# Patient Record
Sex: MALE | Race: BLACK OR AFRICAN AMERICAN | NOT HISPANIC OR LATINO | Employment: FULL TIME | URBAN - METROPOLITAN AREA
[De-identification: names, ages, dates, MRNs, and addresses within clinical notes are randomized per-mention and may not be internally consistent; named-entity substitution may affect disease eponyms.]

---

## 2017-09-13 ENCOUNTER — HOSPITAL ENCOUNTER (EMERGENCY)
Facility: HOSPITAL | Age: 37
Discharge: HOME | End: 2017-09-13
Attending: EMERGENCY MEDICINE

## 2017-09-13 VITALS
DIASTOLIC BLOOD PRESSURE: 92 MMHG | OXYGEN SATURATION: 98 % | RESPIRATION RATE: 18 BRPM | WEIGHT: 250 LBS | TEMPERATURE: 98 F | HEIGHT: 66 IN | BODY MASS INDEX: 40.18 KG/M2 | SYSTOLIC BLOOD PRESSURE: 149 MMHG | HEART RATE: 87 BPM

## 2017-09-13 DIAGNOSIS — R03.0 BLOOD PRESSURE ELEVATED WITHOUT HISTORY OF HTN: Primary | ICD-10-CM

## 2017-09-13 PROCEDURE — 99283 EMERGENCY DEPT VISIT LOW MDM: CPT

## 2017-09-13 NOTE — ED PROVIDER NOTES
"Encounter Date: 9/13/2017       History     Chief Complaint   Patient presents with    Hypertension     went today for physical and was found to have hypertension, denies symptoms     Steven Sinclair, a 37 y.o. male that presents to the ED for concern for hypertension.  He received a physical for a job today and was told that his blood pressure was elevated.  He has no previous HTN diagnosis and wanted to "see if he has high blood pressure". Patient denies any headache, changes in vision or any physical complaints today.              Review of patient's allergies indicates:  No Known Allergies  History reviewed. No pertinent past medical history.  History reviewed. No pertinent surgical history.  History reviewed. No pertinent family history.  Social History   Substance Use Topics    Smoking status: Never Smoker    Smokeless tobacco: Never Used    Alcohol use Yes      Comment: socially     Review of Systems   Constitutional: Negative for fever.   Respiratory: Negative for shortness of breath.    Cardiovascular: Negative for chest pain.        Concern for HTN    Gastrointestinal: Negative for abdominal pain, nausea and vomiting.   Skin: Negative for color change.   Allergic/Immunologic: Negative for immunocompromised state.   Neurological: Negative for dizziness.   Psychiatric/Behavioral: Negative for agitation.   All other systems reviewed and are negative.      Physical Exam     Initial Vitals [09/13/17 1521]   BP Pulse Resp Temp SpO2   (!) 149/92 87 18 98.4 °F (36.9 °C) 98 %      MAP       111         Physical Exam    Nursing note and vitals reviewed.  Constitutional: He appears well-developed and well-nourished.   HENT:   Head: Normocephalic and atraumatic.   Right Ear: External ear normal.   Left Ear: External ear normal.   Nose: Nose normal.   Mouth/Throat: Oropharynx is clear and moist.   Eyes: EOM are normal.   Neck: Normal range of motion. Neck supple.   Cardiovascular: Normal rate and regular " rhythm.   Pulmonary/Chest: Breath sounds normal. No respiratory distress. He has no wheezes. He has no rhonchi. He has no rales.   Abdominal: Soft. Bowel sounds are normal.   Musculoskeletal: Normal range of motion. He exhibits no edema or tenderness.   Neurological: He is alert and oriented to person, place, and time. No cranial nerve deficit.   Skin: Skin is warm and dry. No rash and no abscess noted. No erythema.   Psychiatric: He has a normal mood and affect. Thought content normal.         ED Course   Procedures  Labs Reviewed - No data to display          Medical Decision Making:   Initial Assessment:   Patient has no complaints and wants to know if he has HTN   Differential Diagnosis:   Essential vs secondary HTN vs elevated blood pressure  ED Management:  Patient presents to ED in NAD, afebrile, non-toxic and with no complaints.  He wants to know if he has elevated blood pressure.  Patient's blood pressure is 149/92 currently in ED.  He was informed of the need to have at least 3 BP measurements with elevated pressure to make a diagnosis.  He was given information on lifestyle modifications including diet, exercise and weight loss that may help with HTN.  He was instructed to f/u with PCP for further evaluation.  Strict return precautions given and patient verbalized understanding.                       ED Course      Clinical Impression:   The encounter diagnosis was Blood pressure elevated without history of HTN.                           Annemarie Hassan PA-C  09/13/17 6228

## 2017-09-13 NOTE — ED TRIAGE NOTES
Pt reports was at a physical for his job and was noted to have elevated blood pressure.  Denies hx of HTN, denies any symptoms, denies dizziness, headache, or visual changes.

## 2017-09-25 ENCOUNTER — HOSPITAL ENCOUNTER (EMERGENCY)
Facility: HOSPITAL | Age: 37
Discharge: HOME | End: 2017-09-25
Attending: EMERGENCY MEDICINE

## 2017-09-25 VITALS
SYSTOLIC BLOOD PRESSURE: 125 MMHG | BODY MASS INDEX: 40.18 KG/M2 | WEIGHT: 250 LBS | HEART RATE: 93 BPM | TEMPERATURE: 99 F | HEIGHT: 66 IN | OXYGEN SATURATION: 97 % | DIASTOLIC BLOOD PRESSURE: 77 MMHG | RESPIRATION RATE: 20 BRPM

## 2017-09-25 DIAGNOSIS — R03.0 ELEVATED BLOOD PRESSURE READING: Primary | ICD-10-CM

## 2017-09-25 PROCEDURE — 99283 EMERGENCY DEPT VISIT LOW MDM: CPT

## 2017-09-25 NOTE — ED PROVIDER NOTES
"Encounter Date: 9/25/2017       History     Chief Complaint   Patient presents with    Hypertension     pt was sent from job physical for high blood pressure     Steven Sinclair, a 37 y.o. male that presents to the ED for concern for hypertension.  This is the second time he was "fit tested" for a job and was told that his "bottom number was too high"  He is currently normotensive in the ED.  He received a physical for a job today and was told that his blood pressure was elevated.  He has no previous HTN diagnosis and wanted to "see if he has high blood pressure". Patient denies any headache, changes in vision or any physical complaints today.        The history is provided by the patient.     Review of patient's allergies indicates:  No Known Allergies  History reviewed. No pertinent past medical history.  History reviewed. No pertinent surgical history.  History reviewed. No pertinent family history.  Social History   Substance Use Topics    Smoking status: Never Smoker    Smokeless tobacco: Never Used    Alcohol use Yes      Comment: socially     Review of Systems   Constitutional: Negative for fever.   Respiratory: Negative for shortness of breath.    Cardiovascular: Negative for chest pain.   Musculoskeletal: Negative for back pain.   Skin: Negative for color change.   Neurological: Negative for weakness, numbness and headaches.   Psychiatric/Behavioral: Negative for agitation.   All other systems reviewed and are negative.      Physical Exam     Initial Vitals [09/25/17 1030]   BP Pulse Resp Temp SpO2   138/83 84 18 98.5 °F (36.9 °C) 96 %      MAP       101.33         Physical Exam    Nursing note and vitals reviewed.  Constitutional: He appears well-developed and well-nourished.   HENT:   Head: Normocephalic and atraumatic.   Right Ear: External ear normal.   Left Ear: External ear normal.   Nose: Nose normal.   Mouth/Throat: Oropharynx is clear and moist.   Eyes: EOM are normal.   Neck: Normal " range of motion. Neck supple.   Cardiovascular: Normal rate and regular rhythm.   Pulmonary/Chest: Breath sounds normal.   Abdominal: Soft. Bowel sounds are normal.   Musculoskeletal: Normal range of motion. He exhibits no edema or tenderness.   Neurological: He is alert and oriented to person, place, and time. No cranial nerve deficit.   Skin: Skin is warm and dry. No rash and no abscess noted. No erythema.   Psychiatric: He has a normal mood and affect. Thought content normal.         ED Course   Procedures  Labs Reviewed - No data to display          Medical Decision Making:   Initial Assessment:   Concern for elevated blood pressure  ED Management:  He wants to know if he has elevated blood pressure.  Patient's blood pressure is 138/83 currently in ED.  This is his second visit to the ED for this concern, despite being given instructions to f/u with PCP.  He was informed of the need to have at least 3 BP measurements with elevated pressure to make a diagnosis.  He was given information on lifestyle modifications including diet, exercise and weight loss that may help with HTN.  He was instructed to f/u with PCP for further evaluation and again given information to the [facility].  Strict return precautions given.                Attending Attestation:     Physician Attestation Statement for NP/PA:   I discussed this assessment and plan of this patient with the NP/PA, but I did not personally examine the patient. The face to face encounter was performed by the NP/PA.    Other NP/PA Attestation Additions:    History of Present Illness: Sent from work physical for elevated BP reading.  BP not elevated in ER.  Pt w/o complaints.  Seen for same in past toni BP elevated at work physical.  Never followed up with PCP for evaluation.    Medical Decision Making: Asymptomatic without elevated BP in ER.  Pt again instructed on importance of follow up.                 ED Course      Clinical Impression:   The  encounter diagnosis was Elevated blood pressure reading.                           Annemarie Hassan PA-C  09/25/17 1212       Mary Garcia MD  09/29/17 4180

## 2017-09-25 NOTE — ED TRIAGE NOTES
Pt was sent from job due to high blood pressure reading at job physical pt has no current complaints

## 2018-01-20 ENCOUNTER — HOSPITAL ENCOUNTER (EMERGENCY)
Facility: HOSPITAL | Age: 38
Discharge: HOME | End: 2018-01-20
Attending: EMERGENCY MEDICINE
Payer: COMMERCIAL

## 2018-01-20 VITALS
TEMPERATURE: 100 F | HEART RATE: 110 BPM | RESPIRATION RATE: 18 BRPM | SYSTOLIC BLOOD PRESSURE: 160 MMHG | BODY MASS INDEX: 40.18 KG/M2 | HEIGHT: 66 IN | DIASTOLIC BLOOD PRESSURE: 90 MMHG | WEIGHT: 250 LBS | OXYGEN SATURATION: 98 %

## 2018-01-20 DIAGNOSIS — J06.9 UPPER RESPIRATORY TRACT INFECTION, UNSPECIFIED TYPE: Primary | ICD-10-CM

## 2018-01-20 LAB
FLUAV AG SPEC QL IA: NEGATIVE
FLUBV AG SPEC QL IA: NEGATIVE
SPECIMEN SOURCE: NORMAL

## 2018-01-20 PROCEDURE — 87400 INFLUENZA A/B EACH AG IA: CPT

## 2018-01-20 PROCEDURE — 96372 THER/PROPH/DIAG INJ SC/IM: CPT

## 2018-01-20 PROCEDURE — 25000003 PHARM REV CODE 250: Performed by: EMERGENCY MEDICINE

## 2018-01-20 PROCEDURE — 63600175 PHARM REV CODE 636 W HCPCS: Performed by: EMERGENCY MEDICINE

## 2018-01-20 PROCEDURE — 99283 EMERGENCY DEPT VISIT LOW MDM: CPT | Mod: 25

## 2018-01-20 RX ORDER — ACETAMINOPHEN 500 MG
1000 TABLET ORAL
Status: COMPLETED | OUTPATIENT
Start: 2018-01-20 | End: 2018-01-20

## 2018-01-20 RX ORDER — PROCHLORPERAZINE EDISYLATE 5 MG/ML
10 INJECTION INTRAMUSCULAR; INTRAVENOUS
Status: COMPLETED | OUTPATIENT
Start: 2018-01-20 | End: 2018-01-20

## 2018-01-20 RX ORDER — KETOROLAC TROMETHAMINE 30 MG/ML
30 INJECTION, SOLUTION INTRAMUSCULAR; INTRAVENOUS
Status: COMPLETED | OUTPATIENT
Start: 2018-01-20 | End: 2018-01-20

## 2018-01-20 RX ORDER — ONDANSETRON 4 MG/1
4 TABLET, FILM COATED ORAL EVERY 6 HOURS PRN
Qty: 10 TABLET | Refills: 0 | Status: SHIPPED | OUTPATIENT
Start: 2018-01-20

## 2018-01-20 RX ORDER — BENZONATATE 100 MG/1
100 CAPSULE ORAL 3 TIMES DAILY PRN
Qty: 14 CAPSULE | Refills: 0 | Status: SHIPPED | OUTPATIENT
Start: 2018-01-20 | End: 2024-03-03

## 2018-01-20 RX ADMIN — PROCHLORPERAZINE EDISYLATE 10 MG: 5 INJECTION INTRAMUSCULAR; INTRAVENOUS at 10:01

## 2018-01-20 RX ADMIN — KETOROLAC TROMETHAMINE 30 MG: 30 INJECTION, SOLUTION INTRAMUSCULAR at 10:01

## 2018-01-20 RX ADMIN — ACETAMINOPHEN 1000 MG: 500 TABLET ORAL at 10:01

## 2018-01-21 NOTE — DISCHARGE INSTRUCTIONS
Please make sure you are drinking plenty of fluids, such as gatorade G2 or Powerade Light. In the morning, I recommend taking ibuprofen 600mg every 8 hours with food.

## 2018-01-21 NOTE — ED PROVIDER NOTES
Encounter Date: 1/20/2018       History     Chief Complaint   Patient presents with    Chills     reports flu like s/s for one day. reports wife also has flu     Cough    Fever    Sore Throat    Generalized Body Aches     37-year-old male presents emergency department for about 24 hours of nasal congestion, headache, myalgias, sore throat, cough, fever.  Onset last night, somewhat worse today.  Minimal relief with TheraFlu.  Reports his headache started as a mild, generalized, throbbing headache and is now moderate.  It is gradually worsened since its onset.  It is exacerbated by cough and has no relieving factors reported.  Reports nausea without emesis.  He reports a persistent nonproductive cough.  Also reports sinus pressure and postnasal drip.  Reports subjective fever.  Denies vomiting.  Denies shortness of breath or chest pain.          Review of patient's allergies indicates:  No Known Allergies  History reviewed. No pertinent past medical history.  History reviewed. No pertinent surgical history.  History reviewed. No pertinent family history.  Social History   Substance Use Topics    Smoking status: Never Smoker    Smokeless tobacco: Never Used    Alcohol use Yes      Comment: socially     Review of Systems   Constitutional: Positive for fatigue and fever (subjective). Negative for chills.   HENT: Positive for congestion, postnasal drip, sinus pressure and sore throat. Negative for voice change.    Eyes: Negative for photophobia, pain and redness.   Respiratory: Positive for cough. Negative for choking and shortness of breath.    Cardiovascular: Negative for chest pain, palpitations and leg swelling.   Gastrointestinal: Positive for nausea. Negative for abdominal pain and vomiting.   Genitourinary: Negative for dysuria, frequency and urgency.   Musculoskeletal: Negative for back pain, neck pain and neck stiffness.   Neurological: Positive for headaches. Negative for seizures, speech difficulty,  light-headedness and numbness.   All other systems reviewed and are negative.      Physical Exam     Initial Vitals [01/20/18 2129]   BP Pulse Resp Temp SpO2   (!) 153/100 (!) 121 20 (!) 101 °F (38.3 °C) 100 %      MAP       117.67         Physical Exam    Nursing note and vitals reviewed.  Constitutional: He appears well-developed and well-nourished. He appears distressed (Mild discomfort).   HENT:   Head: Normocephalic and atraumatic.   Oropharynx erythematous without swelling or exudate; Dry MM   Eyes: Conjunctivae and EOM are normal. Pupils are equal, round, and reactive to light.   Neck: Normal range of motion. Neck supple. No tracheal deviation present.   Cardiovascular: Regular rhythm, normal heart sounds and intact distal pulses.   Tachycardic   Pulmonary/Chest: Breath sounds normal. No respiratory distress. He has no wheezes. He has no rhonchi. He has no rales.   Abdominal: Soft. Bowel sounds are normal. He exhibits no distension. There is no tenderness. There is no rebound and no guarding.   Musculoskeletal: Normal range of motion. He exhibits no edema or tenderness.   Neurological: He is alert and oriented to person, place, and time. He has normal strength. No cranial nerve deficit or sensory deficit.   Skin: Skin is warm and dry. Capillary refill takes less than 2 seconds.         ED Course   Procedures  Labs Reviewed   INFLUENZA A AND B ANTIGEN             Medical Decision Making:   Initial Assessment:   37-year-old male presents emergency department complaining of sore throat, postnasal drip, cough, myalgias, fever  Differential Diagnosis:   URI, sinusitis, pneumonia, retropharyngeal nurse peritonsillar abscess, strep pharyngitis, influenza  Clinical Tests:   Lab Tests: Reviewed       <> Summary of Lab: Influenza negative  ED Management:  Patient given IM Toradol and Compazine.  He reports significant improvement in his symptoms.  We discussed disposition including discharge with prescriptions for  Zofran and Tessalon, instructions to increase oral hydration with Gatorade G2 or Powerade light, begin taking ibuprofen 600 mg every 8 hours with food in the morning, follow-up with his primary care physician this week, return with new or worsening symptoms.  Patient expressed good understanding and is comfortable with discharge at this time.                   ED Course      Clinical Impression:   The encounter diagnosis was Upper respiratory tract infection, unspecified type.    Disposition:   Disposition: Discharged  Condition: Stable                        Lj Lopez MD  01/20/18 5759

## 2018-01-21 NOTE — ED NOTES
Patient identifiers for Steven Sinclair checked and correct.  LOC: The patient is awake, alert and aware of environment with an appropriate affect, the patient is oriented x 3 and speaking appropriately.  APPEARANCE: Patient uncomfortable and in no acute distress, patient is clean and well groomed, patient's clothing are properly fastened.  MUSKULOSKELETAL: Patient moving all extremities well, no obvious swelling or deformities noted.  RESPIRATORY: Airway is open and patent, respirations are spontaneous, patient has a normal effort and rate.

## 2023-05-05 ENCOUNTER — OFFICE VISIT (OUTPATIENT)
Dept: URGENT CARE | Facility: CLINIC | Age: 43
End: 2023-05-05
Payer: COMMERCIAL

## 2023-05-05 VITALS
WEIGHT: 250 LBS | SYSTOLIC BLOOD PRESSURE: 119 MMHG | DIASTOLIC BLOOD PRESSURE: 81 MMHG | RESPIRATION RATE: 20 BRPM | HEART RATE: 78 BPM | BODY MASS INDEX: 40.18 KG/M2 | HEIGHT: 66 IN | OXYGEN SATURATION: 95 % | TEMPERATURE: 98 F

## 2023-05-05 DIAGNOSIS — M77.32 HEEL SPUR, LEFT: ICD-10-CM

## 2023-05-05 DIAGNOSIS — M79.672 PAIN OF LEFT HEEL: Primary | ICD-10-CM

## 2023-05-05 DIAGNOSIS — Z76.89 ENCOUNTER TO ESTABLISH CARE: ICD-10-CM

## 2023-05-05 PROCEDURE — 73630 XR FOOT COMPLETE 3 VIEW LEFT: ICD-10-PCS | Mod: FY,LT,S$GLB, | Performed by: RADIOLOGY

## 2023-05-05 PROCEDURE — 99203 OFFICE O/P NEW LOW 30 MIN: CPT | Mod: S$GLB,,,

## 2023-05-05 PROCEDURE — 73630 X-RAY EXAM OF FOOT: CPT | Mod: FY,LT,S$GLB, | Performed by: RADIOLOGY

## 2023-05-05 PROCEDURE — 99203 PR OFFICE/OUTPT VISIT, NEW, LEVL III, 30-44 MIN: ICD-10-PCS | Mod: S$GLB,,,

## 2023-05-05 NOTE — PATIENT INSTRUCTIONS
R.I.C.E:    Rest, apply ice intermittently about 20 minutes 3 times a day, compress with ace wrap/boot, and elevate above heart level as much as possible.  Do not apply ice directly to skin. Wrap ice in cloth before applying.    Please make sure that you wearing appropriate supportive shoes for lower extremity issues including shoes with heel support    OTC Tylenol (acetaminophen) up to 4,000 mg a day is safe for short periods and can be used for pain, and fever. However in high doses and prolonged use it can cause liver irritation.    OTC Ibuprofen (NSAID) is a non-steroidal anti-inflammatory that can be used for pain. However it can also cause stomach irritation if over used.    Of note: Aleve, Motrin, Advil, Mobic, meloxicam, and indomethacin are also other names of NSAIDs.    OTC Voltaren topical cream may be applied for relief, as long as you do not have any allergy to the ingredients.    You will need to follow-up with primary care provider if your symptoms persist, as we discussed. If you do not have a PCP, you will be referred to one    AMBULATORY REFERRAL ORDERED:   Someone will call with an appointment this week.  Please answer all unknown calls.  If you are not contacted within 7 days, Please call clinic  for status of appointment. ([phone])

## 2023-05-05 NOTE — PROGRESS NOTES
"Subjective:      Patient ID: Steven Sinclair is a 43 y.o. male.    Vitals:  height is 5' 6" (1.676 m) and weight is 113.4 kg (250 lb). His temperature is 98.1 °F (36.7 °C). His blood pressure is 119/81 and his pulse is 78. His respiration is 20 and oxygen saturation is 95%.     Chief Complaint: Foot Pain    This is a 43 y.o. male who presents today with a chief complaint of left heel pain x 2 days ago. No injury noted. Aggravated by weight bearing and movement. States this is the 3rd episode since October. States the pain usually last 2 days and then improves steadily. States this time the pain is remaining longer than usual. Patient has tried taking muscle relaxer with no relief. 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 No pain in R foot. No pain in toes or ankle. No leg swelling. NKDA.       Foot Injury   The incident occurred 2 days ago. There was no injury mechanism. Pain location: left heel. The quality of the pain is described as aching. The pain is at a severity of 8/10. The pain is moderate. Pertinent negatives include no numbness or tingling. He reports no foreign bodies present. The symptoms are aggravated by movement and weight bearing. He has tried nothing for the symptoms.     Constitution: Negative for chills and fever.   Musculoskeletal:  Positive for pain with walking. Negative for joint pain, joint swelling and abnormal ROM of joint.   Skin:  Negative for color change, wound, laceration, erythema and bruising.   Neurological:  Negative for numbness and tingling.    Objective:     Vitals:    05/05/23 1047   BP: 119/81   Pulse: 78   Resp: 20   Temp: 98.1 °F (36.7 °C)       Physical Exam   Constitutional: He is oriented to person, place, and time. He appears well-developed.   HENT:   Head: Normocephalic and atraumatic. Head is without abrasion, without contusion and without laceration.   Ears:   Right Ear: External ear normal.   Left Ear: External ear normal.   Nose: Nose normal. "   Mouth/Throat: Oropharynx is clear and moist and mucous membranes are normal.   Eyes: Conjunctivae, EOM and lids are normal. Pupils are equal, round, and reactive to light.   Neck: Trachea normal and phonation normal. Neck supple.   Cardiovascular: Normal rate, regular rhythm and normal heart sounds.   Pulses:       Dorsalis pedis pulses are 2+ on the left side.   Pulmonary/Chest: Effort normal and breath sounds normal. No stridor. No respiratory distress.   Musculoskeletal:         General: Tenderness present. No swelling, deformity or signs of injury.      Right ankle: Normal.      Left ankle: Normal.      Right lower leg: No edema.      Left lower leg: No edema.      Left foot: Anterior drawer test: negative. Plantar foot sensation: normal. Comments: Pain with palpation to posterior heel. FROM of toes and ankle. No puncture wounds or injury. No pain to achilles tendon.         Feet:    Neurological: He is alert and oriented to person, place, and time.   Skin: Skin is warm, dry, intact and no rash. Capillary refill takes less than 2 seconds. No abrasion, No burn, No bruising, No erythema and No ecchymosis   Psychiatric: His speech is normal and behavior is normal. Judgment and thought content normal.   Nursing note and vitals reviewed.    Assessment:     1. Pain of left heel    2. Heel spur, left    3. Encounter to establish care      X-Ray Foot Complete 3 view Left    Result Date: 5/5/2023  EXAMINATION: XR FOOT COMPLETE 3 VIEW LEFT CLINICAL HISTORY: .  Pain in left foot TECHNIQUE: AP, lateral and oblique views of the left foot were performed. COMPARISON: None FINDINGS: No definite evidence of acute fracture or dislocation.  Lisfranc joint congruent.  Joint spaces maintained.  Modest enthesopathic change at the Achilles tendon insertion on the calcaneus.  No evidence of radiopaque foreign body.     No convincing evidence of acute fracture or dislocation. Electronically signed by: Raymond Amador  Date:    05/05/2023 Time:    11:59     Plan:       Pain of left heel  -     X-Ray Foot Complete 3 view Left; Future; Expected date: 05/05/2023    Heel spur, left    Encounter to establish care  -     Ambulatory referral/consult to Internal Medicine        Patient Instructions   R.I.C.E:    Rest, apply ice intermittently about 20 minutes 3 times a day, compress with ace wrap/boot, and elevate above heart level as much as possible.  Do not apply ice directly to skin. Wrap ice in cloth before applying.    Please make sure that you wearing appropriate supportive shoes for lower extremity issues including shoes with heel support    OTC Tylenol (acetaminophen) up to 4,000 mg a day is safe for short periods and can be used for pain, and fever. However in high doses and prolonged use it can cause liver irritation.    OTC Ibuprofen (NSAID) is a non-steroidal anti-inflammatory that can be used for pain. However it can also cause stomach irritation if over used.    Of note: Aleve, Motrin, Advil, Mobic, meloxicam, and indomethacin are also other names of NSAIDs.    OTC Voltaren topical cream may be applied for relief, as long as you do not have any allergy to the ingredients.    You will need to follow-up with primary care provider if your symptoms persist, as we discussed.    Medical Decision Making:   History:   Old Medical Records: I decided to obtain old medical records.  Independently Interpreted Test(s):   I have ordered and independently interpreted X-rays - see summary below.       <> Summary of X-Ray Reading(s): No fracture or dislocation. Heel spur present. Will defer to radiologist.  Clinical Tests:   Radiological Study: Ordered and Reviewed  Urgent Care Management:  Patient refused walking boot. Encouraged RICE method with supportive shoes and to follow up with PCP.

## 2024-03-03 ENCOUNTER — HOSPITAL ENCOUNTER (EMERGENCY)
Facility: HOSPITAL | Age: 44
Discharge: HOME | End: 2024-03-03
Attending: EMERGENCY MEDICINE
Payer: COMMERCIAL

## 2024-03-03 VITALS
RESPIRATION RATE: 18 BRPM | HEART RATE: 78 BPM | TEMPERATURE: 99 F | OXYGEN SATURATION: 99 % | SYSTOLIC BLOOD PRESSURE: 142 MMHG | DIASTOLIC BLOOD PRESSURE: 86 MMHG

## 2024-03-03 DIAGNOSIS — J06.9 VIRAL URI WITH COUGH: Primary | ICD-10-CM

## 2024-03-03 LAB
INFLUENZA A, MOLECULAR: NEGATIVE
INFLUENZA B, MOLECULAR: NEGATIVE
SARS-COV-2 RDRP RESP QL NAA+PROBE: NEGATIVE
SPECIMEN SOURCE: NORMAL

## 2024-03-03 PROCEDURE — 87502 INFLUENZA DNA AMP PROBE: CPT | Performed by: EMERGENCY MEDICINE

## 2024-03-03 PROCEDURE — 99283 EMERGENCY DEPT VISIT LOW MDM: CPT | Mod: 25

## 2024-03-03 PROCEDURE — U0002 COVID-19 LAB TEST NON-CDC: HCPCS | Performed by: EMERGENCY MEDICINE

## 2024-03-03 RX ORDER — BENZONATATE 100 MG/1
100 CAPSULE ORAL 3 TIMES DAILY PRN
Qty: 14 CAPSULE | Refills: 0 | Status: SHIPPED | OUTPATIENT
Start: 2024-03-03

## 2024-03-04 NOTE — ED NOTES
Pt reports to ED with c/o cough, congestion, and runny nose x2 weeks.     Adult Physical Assessment  LOC: Steven Sinclair, 43 y.o. male verified via two identifiers.  The patient is awake, alert, oriented and speaking appropriately at this time.  APPEARANCE: Patient resting comfortably and appears to be in no acute distress at this time. Patient is clean and well groomed, patient's clothing is properly fastened.  SKIN:The skin is warm and dry, color consistent with ethnicity, patient has normal skin turgor and moist mucus membranes, skin intact, no breakdown or brusing noted.  MUSCULOSKELETAL: Patient moving all extremities well, no obvious swelling or deformities noted.  RESPIRATORY: Airway is open and patent, respirations are spontaneous, patient has a normal effort and rate, no accessory muscle use noted. Pt reports cough and congestion.   CARDIAC: Patient has a normal rate and rhythm, no periphreal edema noted in any extremity, capillary refill < 3 seconds in all extremities  ABDOMEN: Soft and non tender to palpation, no abdominal distention noted. Bowel sounds present in all four quadrants.  NEUROLOGIC: Eyes open spontaneously, behavior appropriate to situation, follows commands, facial expression symmetrical, bilateral hand grasp equal and even, purposeful motor response noted, normal sensation in all extremities when touched with a finger.

## 2024-03-04 NOTE — ED PROVIDER NOTES
Encounter Date: 3/3/2024       History     Chief Complaint   Patient presents with    COVID-19 Concerns     Flu and or covid concerns symtoms have been for two weeks with no improvement. Cough congestion runny nose no sore throat     The patient is a 43-year-old male who came to the emergency department with URI symptoms for the past 2 weeks.  He has also had a cough with green sputum.  Afebrile.  The patient does not smoke, no history of COPD.  No fever.  No nausea vomiting or diarrhea no chest pain or shortness of breath.      Review of patient's allergies indicates:  No Known Allergies  History reviewed. No pertinent past medical history.  History reviewed. No pertinent surgical history.  History reviewed. No pertinent family history.  Social History     Tobacco Use    Smoking status: Never    Smokeless tobacco: Never   Substance Use Topics    Alcohol use: Not Currently     Comment: socially    Drug use: No     Review of Systems   Constitutional:  Negative for fever.   HENT:  Positive for congestion. Negative for sore throat.    Respiratory:  Positive for cough. Negative for shortness of breath.    Cardiovascular:  Negative for chest pain.   Gastrointestinal:  Negative for nausea.   Genitourinary:  Negative for dysuria.   Musculoskeletal:  Negative for back pain.   Skin:  Negative for rash.   Neurological:  Negative for weakness.   Hematological:  Does not bruise/bleed easily.       Physical Exam     Initial Vitals [03/03/24 1729]   BP Pulse Resp Temp SpO2   (!) 147/84 83 20 98.1 °F (36.7 °C) 98 %      MAP       --         Physical Exam    Nursing note and vitals reviewed.  Constitutional: He appears well-developed and well-nourished.   HENT:   Mouth/Throat: Oropharynx is clear and moist.   Eyes: Pupils are equal, round, and reactive to light.   Neck: Neck supple.   Normal range of motion.  Pulmonary/Chest: Breath sounds normal.   Abdominal: Abdomen is soft. There is no abdominal tenderness.   Musculoskeletal:          General: No edema. Normal range of motion.      Cervical back: Normal range of motion and neck supple.     Neurological: He is alert and oriented to person, place, and time.   Skin: Skin is warm and dry.         ED Course   Procedures  Labs Reviewed   INFLUENZA A & B BY MOLECULAR   SARS-COV-2 RNA AMPLIFICATION, QUAL          Imaging Results              X-Ray Chest AP Portable (Final result)  Result time 03/03/24 19:50:35      Final result by Devonte Og MD (03/03/24 19:50:35)                   Impression:      1. No acute cardiopulmonary process.      Electronically signed by: Devonte Og MD  Date:    03/03/2024  Time:    19:50               Narrative:    EXAMINATION:  XR CHEST AP PORTABLE    CLINICAL HISTORY:  cough;    TECHNIQUE:  Single frontal view of the chest was performed.    COMPARISON:  None    FINDINGS:  The cardiomediastinal silhouette is not enlarged.  There is no pleural effusion.  The trachea is midline.  The lungs are symmetrically expanded bilaterally without evidence of acute parenchymal process. No large focal consolidation seen.  There is no pneumothorax.  The osseous structures are unremarkable.                                       Medications - No data to display  Medical Decision Making  Differential Diagnosis includes, but is not limited to:  Sepsis, meningitis, cavernous sinus thrombosis, nasal foreign body, otitis media/external, nasal polyp, bacterial sinusitis, allergic rhinitis, influenza, bacterial/viral pharyngitis, peritonsillar abscess, retropharyngeal abscess, bacterial/viral pneumonia.     MDM:  The patient is a 43-year-old male with URI symptoms for the past 2 weeks.  We will check COVID and flu and a chest x-ray    2041: CXR clear, COVID and flu are neg. He will be discharged at this time.    Amount and/or Complexity of Data Reviewed  Labs:  Decision-making details documented in ED Course.  Radiology: ordered.               ED Course as of 03/03/24 2043   Sun  Mar 03, 2024   1913 COVID-19 Rapid Screening [ST]   1913 Influenza A & B by Molecular [ST]      ED Course User Index  [ST] Layla Fallon MD                           Clinical Impression:  Final diagnoses:  [J06.9] Viral URI with cough (Primary)          ED Disposition Condition    Discharge Stable          ED Prescriptions       Medication Sig Dispense Start Date End Date Auth. Provider    benzonatate (TESSALON) 100 MG capsule Take 1 capsule (100 mg total) by mouth 3 (three) times daily as needed for Cough. 14 capsule 3/3/2024 -- Layla Fallon MD          Follow-up Information       Follow up With Specialties Details Why Contact Info Additional Information    [facility] Family Medicine Schedule an appointment as soon as possible for a visit  As needed, [facility] Referral Line (Tel: [phone]) [address], [address]  [place] [address] [phone] Please park in [address] or D and use [address]bert. Take Medical Office Bldg. elevators.             Layla Fallon MD  03/03/24 2044

## 2024-09-05 ENCOUNTER — OFFICE VISIT (OUTPATIENT)
Dept: PRIMARY CARE CLINIC | Facility: CLINIC | Age: 44
End: 2024-09-05
Payer: COMMERCIAL

## 2024-09-05 ENCOUNTER — LAB VISIT (OUTPATIENT)
Dept: LAB | Facility: HOSPITAL | Age: 44
End: 2024-09-05
Attending: STUDENT IN AN ORGANIZED HEALTH CARE EDUCATION/TRAINING PROGRAM
Payer: COMMERCIAL

## 2024-09-05 ENCOUNTER — TELEPHONE (OUTPATIENT)
Dept: SLEEP MEDICINE | Facility: CLINIC | Age: 44
End: 2024-09-05
Payer: COMMERCIAL

## 2024-09-05 VITALS
OXYGEN SATURATION: 94 % | BODY MASS INDEX: 39.86 KG/M2 | WEIGHT: 263 LBS | HEART RATE: 77 BPM | SYSTOLIC BLOOD PRESSURE: 140 MMHG | DIASTOLIC BLOOD PRESSURE: 100 MMHG | HEIGHT: 68 IN

## 2024-09-05 DIAGNOSIS — R06.81 APNEIC EPISODE: ICD-10-CM

## 2024-09-05 DIAGNOSIS — E66.01 MORBID OBESITY: ICD-10-CM

## 2024-09-05 DIAGNOSIS — Z00.00 ANNUAL PHYSICAL EXAM: ICD-10-CM

## 2024-09-05 DIAGNOSIS — R06.83 HABITUAL SNORING: ICD-10-CM

## 2024-09-05 DIAGNOSIS — Z00.00 ANNUAL PHYSICAL EXAM: Primary | ICD-10-CM

## 2024-09-05 LAB
ALBUMIN SERPL BCP-MCNC: 4.1 G/DL (ref 3.5–5.2)
ALP SERPL-CCNC: 136 U/L (ref 55–135)
ALT SERPL W/O P-5'-P-CCNC: 21 U/L (ref 10–44)
ANION GAP SERPL CALC-SCNC: 11 MMOL/L (ref 8–16)
AST SERPL-CCNC: 22 U/L (ref 10–40)
BASOPHILS # BLD AUTO: 0.05 K/UL (ref 0–0.2)
BASOPHILS NFR BLD: 0.8 % (ref 0–1.9)
BILIRUB SERPL-MCNC: 1 MG/DL (ref 0.1–1)
BUN SERPL-MCNC: 12 MG/DL (ref 6–20)
CALCIUM SERPL-MCNC: 10.2 MG/DL (ref 8.7–10.5)
CHLORIDE SERPL-SCNC: 104 MMOL/L (ref 95–110)
CHOLEST SERPL-MCNC: 250 MG/DL (ref 120–199)
CHOLEST/HDLC SERPL: 6.8 {RATIO} (ref 2–5)
CO2 SERPL-SCNC: 27 MMOL/L (ref 23–29)
CREAT SERPL-MCNC: 1.1 MG/DL (ref 0.5–1.4)
DIFFERENTIAL METHOD BLD: ABNORMAL
EOSINOPHIL # BLD AUTO: 0.4 K/UL (ref 0–0.5)
EOSINOPHIL NFR BLD: 6.6 % (ref 0–8)
ERYTHROCYTE [DISTWIDTH] IN BLOOD BY AUTOMATED COUNT: 13.5 % (ref 11.5–14.5)
EST. GFR  (NO RACE VARIABLE): >60 ML/MIN/1.73 M^2
ESTIMATED AVG GLUCOSE: 131 MG/DL (ref 68–131)
GLUCOSE SERPL-MCNC: 105 MG/DL (ref 70–110)
HBA1C MFR BLD: 6.2 % (ref 4–5.6)
HCT VFR BLD AUTO: 48.6 % (ref 40–54)
HCV AB SERPL QL IA: NORMAL
HDLC SERPL-MCNC: 37 MG/DL (ref 40–75)
HDLC SERPL: 14.8 % (ref 20–50)
HGB BLD-MCNC: 15.4 G/DL (ref 14–18)
HIV 1+2 AB+HIV1 P24 AG SERPL QL IA: NORMAL
IMM GRANULOCYTES # BLD AUTO: 0.01 K/UL (ref 0–0.04)
IMM GRANULOCYTES NFR BLD AUTO: 0.2 % (ref 0–0.5)
LDLC SERPL CALC-MCNC: 190.2 MG/DL (ref 63–159)
LYMPHOCYTES # BLD AUTO: 1.8 K/UL (ref 1–4.8)
LYMPHOCYTES NFR BLD: 29.9 % (ref 18–48)
MCH RBC QN AUTO: 28.9 PG (ref 27–31)
MCHC RBC AUTO-ENTMCNC: 31.7 G/DL (ref 32–36)
MCV RBC AUTO: 91 FL (ref 82–98)
MONOCYTES # BLD AUTO: 0.6 K/UL (ref 0.3–1)
MONOCYTES NFR BLD: 10.9 % (ref 4–15)
NEUTROPHILS # BLD AUTO: 3 K/UL (ref 1.8–7.7)
NEUTROPHILS NFR BLD: 51.6 % (ref 38–73)
NONHDLC SERPL-MCNC: 213 MG/DL
NRBC BLD-RTO: 0 /100 WBC
PLATELET # BLD AUTO: 388 K/UL (ref 150–450)
PMV BLD AUTO: 9.4 FL (ref 9.2–12.9)
POTASSIUM SERPL-SCNC: 4.9 MMOL/L (ref 3.5–5.1)
PROT SERPL-MCNC: 8 G/DL (ref 6–8.4)
RBC # BLD AUTO: 5.33 M/UL (ref 4.6–6.2)
SODIUM SERPL-SCNC: 142 MMOL/L (ref 136–145)
TRIGL SERPL-MCNC: 114 MG/DL (ref 30–150)
TSH SERPL DL<=0.005 MIU/L-ACNC: 1.37 UIU/ML (ref 0.4–4)
WBC # BLD AUTO: 5.89 K/UL (ref 3.9–12.7)

## 2024-09-05 PROCEDURE — 84443 ASSAY THYROID STIM HORMONE: CPT | Performed by: STUDENT IN AN ORGANIZED HEALTH CARE EDUCATION/TRAINING PROGRAM

## 2024-09-05 PROCEDURE — 86803 HEPATITIS C AB TEST: CPT | Performed by: STUDENT IN AN ORGANIZED HEALTH CARE EDUCATION/TRAINING PROGRAM

## 2024-09-05 PROCEDURE — 36415 COLL VENOUS BLD VENIPUNCTURE: CPT | Performed by: STUDENT IN AN ORGANIZED HEALTH CARE EDUCATION/TRAINING PROGRAM

## 2024-09-05 PROCEDURE — 99999 PR PBB SHADOW E&M-EST. PATIENT-LVL III: CPT | Mod: PBBFAC,,, | Performed by: STUDENT IN AN ORGANIZED HEALTH CARE EDUCATION/TRAINING PROGRAM

## 2024-09-05 PROCEDURE — 80061 LIPID PANEL: CPT | Performed by: STUDENT IN AN ORGANIZED HEALTH CARE EDUCATION/TRAINING PROGRAM

## 2024-09-05 PROCEDURE — 87389 HIV-1 AG W/HIV-1&-2 AB AG IA: CPT | Performed by: STUDENT IN AN ORGANIZED HEALTH CARE EDUCATION/TRAINING PROGRAM

## 2024-09-05 PROCEDURE — 83036 HEMOGLOBIN GLYCOSYLATED A1C: CPT | Performed by: STUDENT IN AN ORGANIZED HEALTH CARE EDUCATION/TRAINING PROGRAM

## 2024-09-05 PROCEDURE — 85025 COMPLETE CBC W/AUTO DIFF WBC: CPT | Performed by: STUDENT IN AN ORGANIZED HEALTH CARE EDUCATION/TRAINING PROGRAM

## 2024-09-05 PROCEDURE — 80053 COMPREHEN METABOLIC PANEL: CPT | Performed by: STUDENT IN AN ORGANIZED HEALTH CARE EDUCATION/TRAINING PROGRAM

## 2024-09-05 NOTE — TELEPHONE ENCOUNTER
Staff contact pt to schedule an appt      ----- Message from Gabrielle Christiansen sent at 9/5/2024  8:54 AM CDT -----  Regarding: URGENT  Dr. Arita referred (Urgent) pt to Sleep Clinic. Please assist with getting pt in soon. Thanks

## 2024-09-05 NOTE — PROGRESS NOTES
SUBJECTIVE     Chief Complaint   Patient presents with    Establish Care       HPI  Steven Sinclair is a very pleasant 44 y.o. male with medical diagnoses as listed in the medical history and problem list that presents for annual exam. Pt is establishing care with me today.    Pt is UTD on age appropriate CA screening.    Family, social, surgical Hx reviewed         Health Maintenance         Date Due Completion Date    Hepatitis C Screening Never done ---    Lipid Panel Never done ---    HIV Screening Never done ---    TETANUS VACCINE Never done ---    Hemoglobin A1c (Diabetic Prevention Screening) Never done ---    Influenza Vaccine (1) Never done ---    COVID-19 Vaccine (3 - 2023-24 season) 09/01/2024 2/4/2022          Separate EM concern:    Today patient states he has been having increased snoring over the past several months at nighttime noted by his wife.  Wife also notes very frequent apneic episodes throughout his sleep.  Patient notes regular daytime fatigue.  BMI is 39.9 in clinic today.  Endorses diet high in takeout, fatty foods, and refined carbohydrates.    PAST MEDICAL HISTORY:  History reviewed. No pertinent past medical history.    PAST SURGICAL HISTORY:  History reviewed. No pertinent surgical history.    SOCIAL HISTORY:  Social History     Socioeconomic History    Marital status:    Tobacco Use    Smoking status: Never    Smokeless tobacco: Never   Substance and Sexual Activity    Alcohol use: Not Currently     Comment: socially    Drug use: No    Sexual activity: Yes     Partners: Female     Comment: Tubes tie       FAMILY HISTORY:  Family History   Problem Relation Name Age of Onset    Diabetes Father Delbert Sinclair     Throat cancer Father Delbert Sinclair         smoker and heavy drinker    Prostate cancer Neg Hx      Colon cancer Neg Hx         ALLERGIES AND MEDICATIONS: updated and reviewed.  Review of patient's allergies indicates:  No Known Allergies  No current outpatient  "medications on file.     No current facility-administered medications for this visit.       ROS  Review of Systems   Constitutional:  Positive for malaise/fatigue. Negative for fever and weight loss.   Respiratory:  Negative for cough and shortness of breath.    Cardiovascular:  Negative for chest pain and palpitations.   Gastrointestinal:  Negative for abdominal pain, constipation, diarrhea, nausea and vomiting.   Genitourinary:  Negative for dysuria.   Musculoskeletal:  Negative for back pain and joint pain.   Skin:  Negative for rash.   Neurological:  Negative for dizziness, weakness and headaches.   Psychiatric/Behavioral:  Negative for depression. The patient is not nervous/anxious.            OBJECTIVE     Physical Exam  Vitals:    09/05/24 0847   BP: (!) 140/100   Pulse:     Body mass index is 39.99 kg/m².  Weight: 119.3 kg (263 lb 0.1 oz)   Height: 5' 8" (172.7 cm)     Physical Exam  HENT:      Head: Normocephalic and atraumatic.      Nose: Nose normal.      Mouth/Throat:      Mouth: Mucous membranes are moist.      Pharynx: Oropharynx is clear.   Eyes:      Extraocular Movements: Extraocular movements intact.      Conjunctiva/sclera: Conjunctivae normal.      Pupils: Pupils are equal, round, and reactive to light.   Cardiovascular:      Rate and Rhythm: Regular rhythm. Tachycardia present.   Pulmonary:      Effort: Pulmonary effort is normal.      Breath sounds: Normal breath sounds.   Musculoskeletal:         General: No swelling. Normal range of motion.      Cervical back: Normal range of motion.      Right lower leg: No edema.      Left lower leg: No edema.   Skin:     General: Skin is warm.      Findings: No lesion or rash.   Neurological:      General: No focal deficit present.      Mental Status: He is alert and oriented to person, place, and time.      Motor: No weakness.               ASSESSMENT     44 y.o. male with     1. Annual physical exam    2. Morbid obesity    3. Apneic episode    4. " Habitual snoring        PLAN:     1. Annual physical exam  -     HIV 1/2 Ag/Ab (4th Gen); Future; Expected date: 09/05/2024  -     Hepatitis C Antibody; Future; Expected date: 09/05/2024  -     TSH; Future; Expected date: 09/05/2024  -     Lipid Panel; Future; Expected date: 09/05/2024  -     Comprehensive Metabolic Panel; Future; Expected date: 09/05/2024  -     CBC Auto Differential; Future; Expected date: 09/05/2024    2. Morbid obesity  -     Hemoglobin A1C; Future; Expected date: 09/05/2024  -     Ambulatory referral/consult to Sleep Disorders; Future; Expected date: 09/12/2024  Weight loss counseling provided, including healthy diet rich in vegetables with lean meats and light on grains/red meat/snack foods. Also discussed staying hydrated with water and avoiding soft drinks, as well as exercising >5x/week with at least 30 minutes, including heavy walking. Patient verbalized understanding    3. Apneic episode  -     Ambulatory referral/consult to Sleep Disorders; Future; Expected date: 09/12/2024  Referral placed to [facility] Disorder Clinic.    4. Habitual snoring  -     Ambulatory referral/consult to Sleep Disorders; Future; Expected date: 09/12/2024        Discussed age and gender appropriate screenings at this visit and encouraged a healthy diet low in simple carbohydrates, and increased physical activity.  Counseled on medically appropriate vaccines based on age and current health condition.  Screening test reviewed and discussed with patient.      RTC in 1 year     Radha Arita MD  09/05/2024 8:44 AM

## 2024-09-12 ENCOUNTER — PATIENT MESSAGE (OUTPATIENT)
Dept: PRIMARY CARE CLINIC | Facility: CLINIC | Age: 44
End: 2024-09-12
Payer: COMMERCIAL

## 2024-10-16 ENCOUNTER — OFFICE VISIT (OUTPATIENT)
Dept: SLEEP MEDICINE | Facility: CLINIC | Age: 44
End: 2024-10-16
Payer: COMMERCIAL

## 2024-10-16 VITALS
BODY MASS INDEX: 41.36 KG/M2 | HEIGHT: 68 IN | DIASTOLIC BLOOD PRESSURE: 81 MMHG | SYSTOLIC BLOOD PRESSURE: 133 MMHG | WEIGHT: 272.94 LBS | HEART RATE: 83 BPM

## 2024-10-16 DIAGNOSIS — E66.01 MORBID OBESITY: ICD-10-CM

## 2024-10-16 DIAGNOSIS — R06.83 HABITUAL SNORING: ICD-10-CM

## 2024-10-16 DIAGNOSIS — R06.81 APNEIC EPISODE: ICD-10-CM

## 2024-10-16 PROCEDURE — 99999 PR PBB SHADOW E&M-EST. PATIENT-LVL III: CPT | Mod: PBBFAC,,, | Performed by: NURSE PRACTITIONER

## 2024-10-16 NOTE — PROGRESS NOTES
"Referred by Radha Arita MD     NEW PATIENT VISIT    Steven Sinclair  is a pleasant 44 y.o. male who presents in the Fall of 2024 for sleep evaluation.    See assessment below for further history.    No past medical history on file.There is no problem list on file for this patient.  No current outpatient medications on file.      Vitals:    10/16/24 1133   BP: 133/81   BP Location: Left arm   Patient Position: Sitting   Pulse: 83   Weight: 123.8 kg (272 lb 14.9 oz)   Height: 5' 8" (1.727 m)     Physical Exam:    GEN:   Well-appearing  Psych:  Appropriate affect, demonstrates insight  SKIN:  No rash on the face or bridge of the nose      LABS:   Lab Results   Component Value Date    CO2 27 09/05/2024         No echocardiogram results found for the past 12 months     No results found for: "FERRITIN"    RECORDS REVIEWED:      ASSESSMENT    Sig PMH:  PROBLEM DESCRIPTION/ Sx on Presentation  STATUS PLAN     Screening NURA   Presentation:     Has snored for years, but has noticed he's more tired recently.  Wife also reports witnessed apneic events overnight.  He reports gasping arousals. Sister has NURA. He is interested in doing a sleep study.    yes - snoring  yes - gasping arousals/coughing  yes - witnessed apneas, pauses in breathing    yes - night sweats    rarely - AM headaches    no - dry mouth/sore throat      new   -we discussed sleep testing to evaluate for NURA     -discussed possible treatments for NURA including CPAP therapy       Daytime Sx     Generally feels unrefreshed when waking up in the morning. Reports feeling excessively sleepy during the day and/or periods of rest. Dozes often.    ESS 11/24 on intake          new   -will reassess sleepiness after evaluation for NURA     Insomnia       no - difficulty with sleep onset    no - difficulty with sleep maintenance    SLEEP SCHEDULE   Duration    Wind- down    Envmnt    CBTi    Meds prior    Meds now    Bed Time 8-9PM (work)  9-10PM (off) "   Lights out    Latency 15-20min   Arousals 3-5x   Back to sleep 10-15min   Stim. ctrl    Wake time 230AM (work)  5-6AM (off)   Caffeine    Naps 1 (off days)   Nocturia 2   Work                    new   -will reassess after evaluation for sleep-disordered breathing       Nocturia     x 2 per sleep period    unclear          RTC:  will arrange RTC depending on results of sleep testing         PLAN      -recommend sleep testing   -HST ordered  -discussed trial therapy if NURA present and the patient is open to a trial of CPAP therapy  -discussed the etiology of obstructive sleep apnea as well as the potential ramifications of untreated sleep apnea, which could include daytime sleepiness, hypertension, heart disease and/or stroke. We discussed potential treatment options, which could include weight loss, body positioning, continuous positive airway pressure (CPAP), oral appliance, Inspire, or referral for surgical consideration.   -advised on strict driving precautions; advised never to drive drowsy     Advised on plan of care. Answered all patient questions. Patient verbalized understanding and voiced agreement with plan of care.     RTC if dx of NURA made and CPAP ordered, will need follow up 31-90 days after receiving machine for compliance

## 2024-10-21 ENCOUNTER — HOSPITAL ENCOUNTER (OUTPATIENT)
Dept: SLEEP MEDICINE | Facility: OTHER | Age: 44
Discharge: HOME | End: 2024-10-21
Attending: NURSE PRACTITIONER
Payer: COMMERCIAL

## 2024-10-21 DIAGNOSIS — R06.81 APNEIC EPISODE: ICD-10-CM

## 2024-10-21 DIAGNOSIS — E66.01 MORBID OBESITY: ICD-10-CM

## 2024-10-21 DIAGNOSIS — R06.83 HABITUAL SNORING: ICD-10-CM

## 2024-10-21 PROCEDURE — 95800 SLP STDY UNATTENDED: CPT

## 2024-10-22 PROBLEM — E66.01 MORBID OBESITY: Status: ACTIVE | Noted: 2024-10-22

## 2024-10-24 ENCOUNTER — PATIENT MESSAGE (OUTPATIENT)
Dept: SLEEP MEDICINE | Facility: CLINIC | Age: 44
End: 2024-10-24
Payer: COMMERCIAL

## 2024-10-24 DIAGNOSIS — G47.33 OBSTRUCTIVE SLEEP APNEA: Primary | ICD-10-CM

## 2024-11-05 ENCOUNTER — PATIENT MESSAGE (OUTPATIENT)
Dept: RESEARCH | Facility: HOSPITAL | Age: 44
End: 2024-11-05
Payer: COMMERCIAL

## 2024-11-27 ENCOUNTER — PATIENT MESSAGE (OUTPATIENT)
Dept: RESEARCH | Facility: HOSPITAL | Age: 44
End: 2024-11-27
Payer: COMMERCIAL

## 2025-08-27 ENCOUNTER — PATIENT OUTREACH (OUTPATIENT)
Dept: ADMINISTRATIVE | Facility: HOSPITAL | Age: 45
End: 2025-08-27
Payer: COMMERCIAL

## 2025-08-28 ENCOUNTER — OFFICE VISIT (OUTPATIENT)
Dept: PRIMARY CARE CLINIC | Facility: CLINIC | Age: 45
End: 2025-08-28
Payer: COMMERCIAL

## 2025-08-28 VITALS
WEIGHT: 217.81 LBS | DIASTOLIC BLOOD PRESSURE: 85 MMHG | OXYGEN SATURATION: 98 % | HEART RATE: 85 BPM | SYSTOLIC BLOOD PRESSURE: 132 MMHG | BODY MASS INDEX: 33.12 KG/M2

## 2025-08-28 DIAGNOSIS — Z12.11 ENCOUNTER FOR COLORECTAL CANCER SCREENING: ICD-10-CM

## 2025-08-28 DIAGNOSIS — Z13.1 SCREENING FOR DIABETES MELLITUS: ICD-10-CM

## 2025-08-28 DIAGNOSIS — Z00.00 ANNUAL PHYSICAL EXAM: Primary | ICD-10-CM

## 2025-08-28 DIAGNOSIS — Z12.12 ENCOUNTER FOR COLORECTAL CANCER SCREENING: ICD-10-CM

## 2025-08-28 DIAGNOSIS — Z12.5 PROSTATE CANCER SCREENING: ICD-10-CM

## 2025-08-28 PROBLEM — E66.01 MORBID OBESITY: Status: RESOLVED | Noted: 2024-10-22 | Resolved: 2025-08-28

## 2025-08-28 PROCEDURE — 1159F MED LIST DOCD IN RCRD: CPT | Mod: CPTII,S$GLB,, | Performed by: STUDENT IN AN ORGANIZED HEALTH CARE EDUCATION/TRAINING PROGRAM

## 2025-08-28 PROCEDURE — 1160F RVW MEDS BY RX/DR IN RCRD: CPT | Mod: CPTII,S$GLB,, | Performed by: STUDENT IN AN ORGANIZED HEALTH CARE EDUCATION/TRAINING PROGRAM

## 2025-08-28 PROCEDURE — 3075F SYST BP GE 130 - 139MM HG: CPT | Mod: CPTII,S$GLB,, | Performed by: STUDENT IN AN ORGANIZED HEALTH CARE EDUCATION/TRAINING PROGRAM

## 2025-08-28 PROCEDURE — 99999 PR PBB SHADOW E&M-EST. PATIENT-LVL III: CPT | Mod: PBBFAC,,, | Performed by: STUDENT IN AN ORGANIZED HEALTH CARE EDUCATION/TRAINING PROGRAM

## 2025-08-28 PROCEDURE — 99396 PREV VISIT EST AGE 40-64: CPT | Mod: S$GLB,,, | Performed by: STUDENT IN AN ORGANIZED HEALTH CARE EDUCATION/TRAINING PROGRAM

## 2025-08-28 PROCEDURE — 3008F BODY MASS INDEX DOCD: CPT | Mod: CPTII,S$GLB,, | Performed by: STUDENT IN AN ORGANIZED HEALTH CARE EDUCATION/TRAINING PROGRAM

## 2025-08-28 PROCEDURE — 3079F DIAST BP 80-89 MM HG: CPT | Mod: CPTII,S$GLB,, | Performed by: STUDENT IN AN ORGANIZED HEALTH CARE EDUCATION/TRAINING PROGRAM

## 2025-08-29 ENCOUNTER — LAB VISIT (OUTPATIENT)
Dept: LAB | Facility: HOSPITAL | Age: 45
End: 2025-08-29
Attending: STUDENT IN AN ORGANIZED HEALTH CARE EDUCATION/TRAINING PROGRAM
Payer: COMMERCIAL

## 2025-08-29 ENCOUNTER — CLINICAL SUPPORT (OUTPATIENT)
Dept: ENDOSCOPY | Facility: HOSPITAL | Age: 45
End: 2025-08-29
Attending: STUDENT IN AN ORGANIZED HEALTH CARE EDUCATION/TRAINING PROGRAM
Payer: COMMERCIAL

## 2025-08-29 ENCOUNTER — TELEPHONE (OUTPATIENT)
Dept: ENDOSCOPY | Facility: HOSPITAL | Age: 45
End: 2025-08-29

## 2025-08-29 DIAGNOSIS — Z13.1 SCREENING FOR DIABETES MELLITUS: ICD-10-CM

## 2025-08-29 DIAGNOSIS — Z12.5 PROSTATE CANCER SCREENING: ICD-10-CM

## 2025-08-29 DIAGNOSIS — Z00.00 ANNUAL PHYSICAL EXAM: ICD-10-CM

## 2025-08-29 DIAGNOSIS — Z12.11 SPECIAL SCREENING FOR MALIGNANT NEOPLASMS, COLON: Primary | ICD-10-CM

## 2025-08-29 LAB
ABSOLUTE EOSINOPHIL (OHS): 0.19 K/UL
ABSOLUTE MONOCYTE (OHS): 0.72 K/UL (ref 0.3–1)
ABSOLUTE NEUTROPHIL COUNT (OHS): 3.26 K/UL (ref 1.8–7.7)
ALBUMIN SERPL BCP-MCNC: 4.6 G/DL (ref 3.5–5.2)
ALP SERPL-CCNC: 143 UNIT/L (ref 40–150)
ALT SERPL W/O P-5'-P-CCNC: 22 UNIT/L (ref 0–55)
ANION GAP (OHS): 12 MMOL/L (ref 8–16)
AST SERPL-CCNC: 25 UNIT/L (ref 0–50)
BASOPHILS # BLD AUTO: 0.03 K/UL
BASOPHILS NFR BLD AUTO: 0.5 %
BILIRUB SERPL-MCNC: 1.3 MG/DL (ref 0.1–1)
BUN SERPL-MCNC: 12 MG/DL (ref 6–20)
CALCIUM SERPL-MCNC: 9.9 MG/DL (ref 8.7–10.5)
CHLORIDE SERPL-SCNC: 103 MMOL/L (ref 95–110)
CHOLEST SERPL-MCNC: 187 MG/DL (ref 120–199)
CHOLEST/HDLC SERPL: 4.9 {RATIO} (ref 2–5)
CO2 SERPL-SCNC: 24 MMOL/L (ref 23–29)
CREAT SERPL-MCNC: 1 MG/DL (ref 0.5–1.4)
EAG (OHS): 117 MG/DL (ref 68–131)
ERYTHROCYTE [DISTWIDTH] IN BLOOD BY AUTOMATED COUNT: 13.8 % (ref 11.5–14.5)
GFR SERPLBLD CREATININE-BSD FMLA CKD-EPI: >60 ML/MIN/1.73/M2
GLUCOSE SERPL-MCNC: 81 MG/DL (ref 70–110)
HBA1C MFR BLD: 5.7 % (ref 4–5.6)
HCT VFR BLD AUTO: 49.2 % (ref 40–54)
HDLC SERPL-MCNC: 38 MG/DL (ref 40–75)
HDLC SERPL: 20.3 % (ref 20–50)
HGB BLD-MCNC: 15.9 GM/DL (ref 14–18)
IMM GRANULOCYTES # BLD AUTO: 0.01 K/UL (ref 0–0.04)
IMM GRANULOCYTES NFR BLD AUTO: 0.2 % (ref 0–0.5)
LDLC SERPL CALC-MCNC: 134.6 MG/DL (ref 63–159)
LYMPHOCYTES # BLD AUTO: 1.7 K/UL (ref 1–4.8)
MCH RBC QN AUTO: 29 PG (ref 27–31)
MCHC RBC AUTO-ENTMCNC: 32.3 G/DL (ref 32–36)
MCV RBC AUTO: 90 FL (ref 82–98)
NONHDLC SERPL-MCNC: 149 MG/DL
NUCLEATED RBC (/100WBC) (OHS): 0 /100 WBC
PLATELET # BLD AUTO: 416 K/UL (ref 150–450)
PMV BLD AUTO: 9.4 FL (ref 9.2–12.9)
POTASSIUM SERPL-SCNC: 4.4 MMOL/L (ref 3.5–5.1)
PROT SERPL-MCNC: 8.3 GM/DL (ref 6–8.4)
PSA SERPL-MCNC: 1.34 NG/ML
RBC # BLD AUTO: 5.49 M/UL (ref 4.6–6.2)
RELATIVE EOSINOPHIL (OHS): 3.2 %
RELATIVE LYMPHOCYTE (OHS): 28.8 % (ref 18–48)
RELATIVE MONOCYTE (OHS): 12.2 % (ref 4–15)
RELATIVE NEUTROPHIL (OHS): 55.1 % (ref 38–73)
SODIUM SERPL-SCNC: 139 MMOL/L (ref 136–145)
TRIGL SERPL-MCNC: 72 MG/DL (ref 30–150)
TSH SERPL-ACNC: 1.66 UIU/ML (ref 0.4–4)
WBC # BLD AUTO: 5.91 K/UL (ref 3.9–12.7)

## 2025-08-29 PROCEDURE — 84443 ASSAY THYROID STIM HORMONE: CPT

## 2025-08-29 PROCEDURE — 84153 ASSAY OF PSA TOTAL: CPT

## 2025-08-29 PROCEDURE — 80061 LIPID PANEL: CPT

## 2025-08-29 PROCEDURE — 80053 COMPREHEN METABOLIC PANEL: CPT

## 2025-08-29 PROCEDURE — 85025 COMPLETE CBC W/AUTO DIFF WBC: CPT

## 2025-08-29 PROCEDURE — 36415 COLL VENOUS BLD VENIPUNCTURE: CPT

## 2025-08-29 PROCEDURE — 83036 HEMOGLOBIN GLYCOSYLATED A1C: CPT

## 2025-08-29 RX ORDER — SODIUM, POTASSIUM,MAG SULFATES 17.5-3.13G
1 SOLUTION, RECONSTITUTED, ORAL ORAL DAILY
Qty: 1 KIT | Refills: 0 | Status: SHIPPED | OUTPATIENT
Start: 2025-08-29 | End: 2025-08-31

## 2025-09-03 ENCOUNTER — ANESTHESIA (OUTPATIENT)
Dept: ENDOSCOPY | Facility: HOSPITAL | Age: 45
End: 2025-09-03
Payer: COMMERCIAL

## 2025-09-03 ENCOUNTER — ANESTHESIA EVENT (OUTPATIENT)
Dept: ENDOSCOPY | Facility: HOSPITAL | Age: 45
End: 2025-09-03
Payer: COMMERCIAL

## 2025-09-03 PROCEDURE — 25000003 PHARM REV CODE 250: Performed by: NURSE ANESTHETIST, CERTIFIED REGISTERED

## 2025-09-03 PROCEDURE — 63600175 PHARM REV CODE 636 W HCPCS: Performed by: NURSE ANESTHETIST, CERTIFIED REGISTERED

## 2025-09-03 RX ORDER — LIDOCAINE HYDROCHLORIDE 20 MG/ML
INJECTION INTRAVENOUS
Status: DISCONTINUED | OUTPATIENT
Start: 2025-09-03 | End: 2025-09-03

## 2025-09-03 RX ORDER — PROPOFOL 10 MG/ML
VIAL (ML) INTRAVENOUS
Status: DISCONTINUED | OUTPATIENT
Start: 2025-09-03 | End: 2025-09-03

## 2025-09-03 RX ORDER — SODIUM CHLORIDE 9 MG/ML
INJECTION, SOLUTION INTRAVENOUS CONTINUOUS PRN
Status: DISCONTINUED | OUTPATIENT
Start: 2025-09-03 | End: 2025-09-03

## 2025-09-03 RX ADMIN — PROPOFOL 100 MG: 10 INJECTION, EMULSION INTRAVENOUS at 09:09

## 2025-09-03 RX ADMIN — LIDOCAINE HYDROCHLORIDE 50 MG: 20 INJECTION INTRAVENOUS at 09:09

## 2025-09-03 RX ADMIN — SODIUM CHLORIDE: 0.9 INJECTION, SOLUTION INTRAVENOUS at 09:09
